# Patient Record
Sex: MALE | Race: BLACK OR AFRICAN AMERICAN | Employment: FULL TIME | URBAN - METROPOLITAN AREA
[De-identification: names, ages, dates, MRNs, and addresses within clinical notes are randomized per-mention and may not be internally consistent; named-entity substitution may affect disease eponyms.]

---

## 2017-04-24 ENCOUNTER — OFFICE VISIT (OUTPATIENT)
Dept: FAMILY MEDICINE CLINIC | Age: 37
End: 2017-04-24

## 2017-04-24 VITALS
BODY MASS INDEX: 29.77 KG/M2 | WEIGHT: 201 LBS | SYSTOLIC BLOOD PRESSURE: 128 MMHG | RESPIRATION RATE: 16 BRPM | HEART RATE: 76 BPM | HEIGHT: 69 IN | DIASTOLIC BLOOD PRESSURE: 78 MMHG | OXYGEN SATURATION: 98 %

## 2017-04-24 DIAGNOSIS — J30.2 SEASONAL AND PERENNIAL ALLERGIC RHINITIS: ICD-10-CM

## 2017-04-24 DIAGNOSIS — J30.89 SEASONAL AND PERENNIAL ALLERGIC RHINITIS: ICD-10-CM

## 2017-04-24 DIAGNOSIS — I10 ESSENTIAL HYPERTENSION: ICD-10-CM

## 2017-04-24 PROCEDURE — 99213 OFFICE O/P EST LOW 20 MIN: CPT | Performed by: FAMILY MEDICINE

## 2017-09-22 RX ORDER — ACYCLOVIR 400 MG/1
TABLET ORAL
Qty: 60 TABLET | Refills: 0 | Status: SHIPPED | OUTPATIENT
Start: 2017-09-22 | End: 2019-02-12 | Stop reason: SDUPTHER

## 2017-10-20 LAB
CHOLESTEROL, TOTAL: 183 MG/DL
CHOLESTEROL/HDL RATIO: ABNORMAL
GLUCOSE BLD-MCNC: 79 MG/DL
HDLC SERPL-MCNC: 83 MG/DL (ref 35–70)
LDL CHOLESTEROL CALCULATED: 77 MG/DL (ref 0–160)
TRIGL SERPL-MCNC: 115 MG/DL
VLDLC SERPL CALC-MCNC: ABNORMAL MG/DL

## 2017-10-23 ENCOUNTER — OFFICE VISIT (OUTPATIENT)
Dept: FAMILY MEDICINE CLINIC | Age: 37
End: 2017-10-23

## 2017-10-23 VITALS
SYSTOLIC BLOOD PRESSURE: 130 MMHG | OXYGEN SATURATION: 98 % | BODY MASS INDEX: 30.16 KG/M2 | DIASTOLIC BLOOD PRESSURE: 78 MMHG | WEIGHT: 203.6 LBS | HEART RATE: 86 BPM | HEIGHT: 69 IN | RESPIRATION RATE: 18 BRPM

## 2017-10-23 DIAGNOSIS — I10 ESSENTIAL HYPERTENSION: Primary | ICD-10-CM

## 2017-10-23 DIAGNOSIS — J30.2 SEASONAL AND PERENNIAL ALLERGIC RHINITIS: ICD-10-CM

## 2017-10-23 DIAGNOSIS — J30.2 OTHER SEASONAL ALLERGIC RHINITIS: ICD-10-CM

## 2017-10-23 DIAGNOSIS — J30.89 SEASONAL AND PERENNIAL ALLERGIC RHINITIS: ICD-10-CM

## 2017-10-23 DIAGNOSIS — Z23 NEEDS FLU SHOT: ICD-10-CM

## 2017-10-23 LAB
ANION GAP SERPL CALCULATED.3IONS-SCNC: 14 MMOL/L (ref 3–16)
BUN BLDV-MCNC: 14 MG/DL (ref 7–20)
CALCIUM SERPL-MCNC: 9.2 MG/DL (ref 8.3–10.6)
CHLORIDE BLD-SCNC: 105 MMOL/L (ref 99–110)
CO2: 28 MMOL/L (ref 21–32)
CREAT SERPL-MCNC: 1.1 MG/DL (ref 0.9–1.3)
GFR AFRICAN AMERICAN: >60
GFR NON-AFRICAN AMERICAN: >60
GLUCOSE BLD-MCNC: 95 MG/DL (ref 70–99)
POTASSIUM SERPL-SCNC: 4.2 MMOL/L (ref 3.5–5.1)
SODIUM BLD-SCNC: 147 MMOL/L (ref 136–145)

## 2017-10-23 PROCEDURE — 90686 IIV4 VACC NO PRSV 0.5 ML IM: CPT | Performed by: FAMILY MEDICINE

## 2017-10-23 PROCEDURE — 36415 COLL VENOUS BLD VENIPUNCTURE: CPT | Performed by: FAMILY MEDICINE

## 2017-10-23 PROCEDURE — 90471 IMMUNIZATION ADMIN: CPT | Performed by: FAMILY MEDICINE

## 2017-10-23 PROCEDURE — 99213 OFFICE O/P EST LOW 20 MIN: CPT | Performed by: FAMILY MEDICINE

## 2017-10-23 RX ORDER — FLUTICASONE PROPIONATE 50 MCG
SPRAY, SUSPENSION (ML) NASAL
Qty: 1 BOTTLE | Refills: 5 | Status: SHIPPED | OUTPATIENT
Start: 2017-10-23 | End: 2018-11-20 | Stop reason: SDUPTHER

## 2017-10-23 RX ORDER — AMLODIPINE BESYLATE 5 MG/1
5 TABLET ORAL DAILY
Qty: 90 TABLET | Refills: 3 | Status: SHIPPED | OUTPATIENT
Start: 2017-10-23 | End: 2018-01-02 | Stop reason: SDUPTHER

## 2017-10-23 NOTE — PROGRESS NOTES
Vaccine Information Sheet, \"Influenza - Inactivated\"  given to Marian Thompson, or parent/legal guardian of  Marian Thompson and verbalized understanding. Patient responses:    Have you ever had a reaction to a flu vaccine? No  Are you able to eat eggs without adverse effects? Yes  Do you have any current illness? No  Have you ever had Guillian Everton Syndrome? No    Flu vaccine given per order. Please see immunization tab.

## 2018-02-19 ENCOUNTER — HOSPITAL ENCOUNTER (OUTPATIENT)
Dept: GENERAL RADIOLOGY | Age: 38
Discharge: HOME | End: 2018-02-19
Attending: FAMILY MEDICINE | Admitting: FAMILY MEDICINE

## 2018-02-19 ENCOUNTER — OFFICE VISIT (OUTPATIENT)
Dept: FAMILY MEDICINE CLINIC | Age: 38
End: 2018-02-19

## 2018-02-19 VITALS
HEIGHT: 69 IN | HEART RATE: 93 BPM | DIASTOLIC BLOOD PRESSURE: 86 MMHG | SYSTOLIC BLOOD PRESSURE: 134 MMHG | BODY MASS INDEX: 30.72 KG/M2 | WEIGHT: 207.4 LBS | OXYGEN SATURATION: 99 %

## 2018-02-19 DIAGNOSIS — R14.0 ABDOMINAL BLOATING: ICD-10-CM

## 2018-02-19 DIAGNOSIS — R10.12 ABDOMINAL WALL PAIN IN LEFT UPPER QUADRANT: ICD-10-CM

## 2018-02-19 DIAGNOSIS — R14.0 ABDOMINAL BLOATING: Primary | ICD-10-CM

## 2018-02-19 PROCEDURE — 99213 OFFICE O/P EST LOW 20 MIN: CPT | Performed by: FAMILY MEDICINE

## 2018-02-19 ASSESSMENT — PATIENT HEALTH QUESTIONNAIRE - PHQ9
2. FEELING DOWN, DEPRESSED OR HOPELESS: 0
1. LITTLE INTEREST OR PLEASURE IN DOING THINGS: 0
SUM OF ALL RESPONSES TO PHQ9 QUESTIONS 1 & 2: 0
SUM OF ALL RESPONSES TO PHQ QUESTIONS 1-9: 0

## 2018-02-19 NOTE — PROGRESS NOTES
bloody stools or weight loss. 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 PHQ Scores 2/19/2018 10/24/2016 10/15/2015   PHQ2 Score 0 0 0   PHQ9 Score 0 0 0     Interpretation of Total Score Depression Severity: 1-4 = Minimal depression, 5-9 = Mild depression, 10-14 = Moderate depression, 15-19 = Moderately severe depression, 20-27 = Severe depression      ROS: Pertinent items are noted in HPI. All other ROS negative     reports that he quit smoking about 8 years ago. He has never used smokeless tobacco.   reports that he drinks alcohol. Physical Exam   Nursing note reviewed  /86 (Site: Left Arm, Position: Sitting, Cuff Size: Large Adult)   Pulse 93   Ht 5' 9\" (1.753 m)   Wt 207 lb 6.4 oz (94.1 kg)   SpO2 99%   BMI 30.63 kg/m²   BP Readings from Last 3 Encounters:   02/19/18 134/86   10/23/17 130/78   04/24/17 128/78     Wt Readings from Last 3 Encounters:   02/19/18 207 lb 6.4 oz (94.1 kg)   10/23/17 203 lb 9.6 oz (92.4 kg)   04/24/17 201 lb (91.2 kg)     No results found for this visit on 02/19/18. Physical Exam   Constitutional: He is oriented to person, place, and time. He appears well-developed and well-nourished. HENT:   Mouth/Throat: Oropharynx is clear and moist.   Eyes: Conjunctivae and EOM are normal. Pupils are equal, round, and reactive to light. Neck: Neck supple. Cardiovascular: Normal rate, regular rhythm and normal heart sounds. Pulmonary/Chest: Effort normal and breath sounds normal.   Abdominal: Soft. Bowel sounds are normal. He exhibits no distension, no ascites and no mass. There is no tenderness. There is no rigidity, no guarding and no CVA tenderness. Hernia confirmed negative in the ventral area. Neurological: He is alert and oriented to person, place, and time. Skin: Skin is warm and dry. Psychiatric: He has a normal mood and affect.          Assessment and Plan: See above

## 2018-02-20 ENCOUNTER — TELEPHONE (OUTPATIENT)
Dept: FAMILY MEDICINE CLINIC | Age: 38
End: 2018-02-20

## 2018-02-22 NOTE — TELEPHONE ENCOUNTER
Spoke with patient about xray results he is to increase stool softener to 1 bid and increase water to 60-70 ounces per day. He will notify status in 1 week. s

## 2018-05-21 DIAGNOSIS — J30.2 OTHER SEASONAL ALLERGIC RHINITIS: ICD-10-CM

## 2018-05-22 RX ORDER — MONTELUKAST SODIUM 10 MG/1
TABLET ORAL
Qty: 90 TABLET | Refills: 0 | Status: SHIPPED | OUTPATIENT
Start: 2018-05-22 | End: 2018-06-05 | Stop reason: SDUPTHER

## 2018-06-05 ENCOUNTER — OFFICE VISIT (OUTPATIENT)
Dept: FAMILY MEDICINE CLINIC | Age: 38
End: 2018-06-05

## 2018-06-05 VITALS
RESPIRATION RATE: 16 BRPM | HEART RATE: 77 BPM | OXYGEN SATURATION: 98 % | BODY MASS INDEX: 29.33 KG/M2 | HEIGHT: 69 IN | SYSTOLIC BLOOD PRESSURE: 130 MMHG | WEIGHT: 198 LBS | DIASTOLIC BLOOD PRESSURE: 78 MMHG

## 2018-06-05 DIAGNOSIS — I10 ESSENTIAL HYPERTENSION: Primary | ICD-10-CM

## 2018-06-05 DIAGNOSIS — L20.89 OTHER ATOPIC DERMATITIS: ICD-10-CM

## 2018-06-05 DIAGNOSIS — J30.2 OTHER SEASONAL ALLERGIC RHINITIS: ICD-10-CM

## 2018-06-05 PROCEDURE — 99214 OFFICE O/P EST MOD 30 MIN: CPT | Performed by: FAMILY MEDICINE

## 2018-06-05 RX ORDER — AMLODIPINE BESYLATE 5 MG/1
TABLET ORAL
Qty: 90 TABLET | Refills: 3 | Status: SHIPPED | OUTPATIENT
Start: 2018-06-05 | End: 2020-05-01 | Stop reason: SDUPTHER

## 2018-06-05 RX ORDER — TRIAMCINOLONE ACETONIDE 1 MG/G
CREAM TOPICAL EVERY 12 HOURS
Qty: 30 G | Refills: 3 | Status: SHIPPED | OUTPATIENT
Start: 2018-06-05 | End: 2020-06-22

## 2018-06-05 RX ORDER — MONTELUKAST SODIUM 10 MG/1
TABLET ORAL
Qty: 90 TABLET | Refills: 3 | Status: SHIPPED | OUTPATIENT
Start: 2018-06-05 | End: 2019-04-25 | Stop reason: SDUPTHER

## 2018-09-13 LAB
AVERAGE GLUCOSE: NORMAL
CHOLESTEROL, TOTAL: 144 MG/DL
CHOLESTEROL/HDL RATIO: NORMAL
GLUCOSE BLD-MCNC: 96 MG/DL
HBA1C MFR BLD: 4.9 %
HDLC SERPL-MCNC: 64 MG/DL (ref 35–70)
LDL CHOLESTEROL CALCULATED: 60 MG/DL (ref 0–160)
TRIGL SERPL-MCNC: 103 MG/DL
VLDLC SERPL CALC-MCNC: NORMAL MG/DL

## 2018-10-12 DIAGNOSIS — I10 ESSENTIAL HYPERTENSION: ICD-10-CM

## 2018-10-15 RX ORDER — AMLODIPINE BESYLATE 5 MG/1
TABLET ORAL
Qty: 90 TABLET | Refills: 2 | Status: SHIPPED | OUTPATIENT
Start: 2018-10-15 | End: 2019-04-02 | Stop reason: SDUPTHER

## 2018-11-09 ENCOUNTER — PATIENT MESSAGE (OUTPATIENT)
Dept: FAMILY MEDICINE CLINIC | Age: 38
End: 2018-11-09

## 2018-11-20 DIAGNOSIS — J30.2 OTHER SEASONAL ALLERGIC RHINITIS: ICD-10-CM

## 2018-11-20 RX ORDER — FLUTICASONE PROPIONATE 50 MCG
SPRAY, SUSPENSION (ML) NASAL
Qty: 16 G | Refills: 5 | Status: SHIPPED | OUTPATIENT
Start: 2018-11-20 | End: 2019-07-17 | Stop reason: SDUPTHER

## 2018-12-18 ENCOUNTER — OFFICE VISIT (OUTPATIENT)
Dept: FAMILY MEDICINE CLINIC | Age: 38
End: 2018-12-18
Payer: COMMERCIAL

## 2018-12-18 VITALS
SYSTOLIC BLOOD PRESSURE: 124 MMHG | OXYGEN SATURATION: 98 % | WEIGHT: 183.4 LBS | BODY MASS INDEX: 27.8 KG/M2 | HEART RATE: 79 BPM | DIASTOLIC BLOOD PRESSURE: 78 MMHG | HEIGHT: 68 IN

## 2018-12-18 DIAGNOSIS — R07.9 CHEST PAIN, UNSPECIFIED TYPE: Primary | ICD-10-CM

## 2018-12-18 PROCEDURE — 99213 OFFICE O/P EST LOW 20 MIN: CPT | Performed by: NURSE PRACTITIONER

## 2018-12-18 PROCEDURE — 93000 ELECTROCARDIOGRAM COMPLETE: CPT | Performed by: NURSE PRACTITIONER

## 2018-12-18 ASSESSMENT — ENCOUNTER SYMPTOMS
COUGH: 0
NAUSEA: 0
SHORTNESS OF BREATH: 0
HEMOPTYSIS: 0

## 2018-12-19 ASSESSMENT — ENCOUNTER SYMPTOMS
CONSTIPATION: 0
WHEEZING: 0
CHOKING: 0
DIARRHEA: 0
CHEST TIGHTNESS: 0

## 2019-02-14 RX ORDER — ACYCLOVIR 400 MG/1
TABLET ORAL
Qty: 60 TABLET | Refills: 0 | Status: SHIPPED | OUTPATIENT
Start: 2019-02-14 | End: 2019-09-26 | Stop reason: SDUPTHER

## 2019-04-02 DIAGNOSIS — I10 ESSENTIAL HYPERTENSION: ICD-10-CM

## 2019-04-03 RX ORDER — AMLODIPINE BESYLATE 5 MG/1
TABLET ORAL
Qty: 90 TABLET | Refills: 0 | Status: SHIPPED | OUTPATIENT
Start: 2019-04-03 | End: 2019-06-11 | Stop reason: SDUPTHER

## 2019-04-24 DIAGNOSIS — J30.2 OTHER SEASONAL ALLERGIC RHINITIS: ICD-10-CM

## 2019-04-25 RX ORDER — MONTELUKAST SODIUM 10 MG/1
10 TABLET ORAL DAILY
Qty: 90 TABLET | Refills: 3 | Status: SHIPPED | OUTPATIENT
Start: 2019-04-25 | End: 2020-05-01 | Stop reason: SDUPTHER

## 2019-06-11 ENCOUNTER — OFFICE VISIT (OUTPATIENT)
Dept: FAMILY MEDICINE CLINIC | Age: 39
End: 2019-06-11
Payer: COMMERCIAL

## 2019-06-11 VITALS
RESPIRATION RATE: 14 BRPM | WEIGHT: 167.6 LBS | SYSTOLIC BLOOD PRESSURE: 128 MMHG | HEART RATE: 80 BPM | DIASTOLIC BLOOD PRESSURE: 7 MMHG | HEIGHT: 68 IN | OXYGEN SATURATION: 99 % | BODY MASS INDEX: 25.4 KG/M2

## 2019-06-11 DIAGNOSIS — M79.644 THUMB PAIN, RIGHT: Primary | ICD-10-CM

## 2019-06-11 DIAGNOSIS — I10 ESSENTIAL HYPERTENSION: ICD-10-CM

## 2019-06-11 PROCEDURE — G8419 CALC BMI OUT NRM PARAM NOF/U: HCPCS | Performed by: FAMILY MEDICINE

## 2019-06-11 PROCEDURE — 99213 OFFICE O/P EST LOW 20 MIN: CPT | Performed by: FAMILY MEDICINE

## 2019-06-11 PROCEDURE — 1036F TOBACCO NON-USER: CPT | Performed by: FAMILY MEDICINE

## 2019-06-11 PROCEDURE — G8427 DOCREV CUR MEDS BY ELIG CLIN: HCPCS | Performed by: FAMILY MEDICINE

## 2019-06-11 ASSESSMENT — PATIENT HEALTH QUESTIONNAIRE - PHQ9
SUM OF ALL RESPONSES TO PHQ9 QUESTIONS 1 & 2: 0
SUM OF ALL RESPONSES TO PHQ QUESTIONS 1-9: 0
2. FEELING DOWN, DEPRESSED OR HOPELESS: 0
SUM OF ALL RESPONSES TO PHQ QUESTIONS 1-9: 0
1. LITTLE INTEREST OR PLEASURE IN DOING THINGS: 0

## 2019-06-11 NOTE — PATIENT INSTRUCTIONS
Patient Education        Thumb Sprain: Rehab Exercises  Your Care Instructions  Here are some examples of typical rehabilitation exercises for your condition. Start each exercise slowly. Ease off the exercise if you start to have pain. Your doctor or your physical or occupational therapist will tell you when you can start these exercises. He or she will also tell you which ones will work best for you. How to do the exercises  Thumb IP flexion    1. Place your forearm and hand on a table with your affected thumb pointing up. 2. With your other hand, hold your thumb steady just below the joint nearest your thumbnail. 3. Bend the tip of your thumb downward, then straighten it. 4. Repeat 8 to 12 times. Thumb MP flexion    1. Place your forearm and hand on a table with your affected thumb pointing up. 2. With your other hand, hold the base of your thumb and palm steady. 3. Bend your thumb downward where it meets your palm, then straighten it. 4. Repeat 8 to 12 times. Thumb opposition    1. With your affected hand, point your fingers and thumb straight up. Your wrist should be relaxed, following the line of your fingers and thumb. 2. Touch your affected thumb to each finger, one finger at a time. This will look like an \"okay\" sign, but try to keep your other fingers straight and pointing upward as much as you can. 3. Repeat 8 to 12 times. Follow-up care is a key part of your treatment and safety. Be sure to make and go to all appointments, and call your doctor if you are having problems. It's also a good idea to know your test results and keep a list of the medicines you take. Where can you learn more? Go to [email or web address] and sign in to your [facility] account. Enter D278 in the [employer] box to learn more about \"Thumb Sprain: Rehab Exercises. \"     If you do not have an account, please click on the \"Sign Up Now\" link.   Current as of: September 20, 2018  Content Version: 12.0  © 2599-2490 Healthwise, Incorporated. Care instructions adapted under license by [address] If you have questions about a medical condition or this instruction, always ask your healthcare professional. [address] any warranty or liability for your use of this information.

## 2019-06-20 ENCOUNTER — PATIENT MESSAGE (OUTPATIENT)
Dept: FAMILY MEDICINE CLINIC | Age: 39
End: 2019-06-20

## 2019-06-24 DIAGNOSIS — I10 ESSENTIAL HYPERTENSION: ICD-10-CM

## 2019-06-24 RX ORDER — AMLODIPINE BESYLATE 5 MG/1
TABLET ORAL
Qty: 90 TABLET | Refills: 3 | Status: SHIPPED | OUTPATIENT
Start: 2019-06-24 | End: 2020-12-15 | Stop reason: SDUPTHER

## 2019-07-17 ENCOUNTER — PATIENT MESSAGE (OUTPATIENT)
Dept: FAMILY MEDICINE CLINIC | Age: 39
End: 2019-07-17

## 2019-07-17 DIAGNOSIS — J30.2 OTHER SEASONAL ALLERGIC RHINITIS: ICD-10-CM

## 2019-07-17 RX ORDER — FLUTICASONE PROPIONATE 50 MCG
SPRAY, SUSPENSION (ML) NASAL
Qty: 16 G | Refills: 5 | Status: SHIPPED | OUTPATIENT
Start: 2019-07-17 | End: 2020-08-10

## 2019-09-27 RX ORDER — ACYCLOVIR 400 MG/1
TABLET ORAL
Qty: 60 TABLET | Refills: 0 | Status: SHIPPED | OUTPATIENT
Start: 2019-09-27 | End: 2021-04-06

## 2020-05-01 RX ORDER — MONTELUKAST SODIUM 10 MG/1
10 TABLET ORAL DAILY
Qty: 90 TABLET | Refills: 3 | Status: SHIPPED | OUTPATIENT
Start: 2020-05-01 | End: 2021-02-26 | Stop reason: SDUPTHER

## 2020-05-01 RX ORDER — AMLODIPINE BESYLATE 5 MG/1
TABLET ORAL
Qty: 90 TABLET | Refills: 3 | Status: SHIPPED | OUTPATIENT
Start: 2020-05-01 | End: 2020-06-22

## 2020-05-19 ENCOUNTER — TELEPHONE (OUTPATIENT)
Dept: FAMILY MEDICINE CLINIC | Age: 40
End: 2020-05-19

## 2020-05-19 NOTE — TELEPHONE ENCOUNTER
Sp to pt on the phone and he will bring fmla forms in and also a sheet of paper stating how often he is needing off and what for. pcp agreed to fill out.  Co-pay for visit refunded and appt was cancelled

## 2020-06-22 ENCOUNTER — OFFICE VISIT (OUTPATIENT)
Dept: FAMILY MEDICINE CLINIC | Age: 40
End: 2020-06-22
Payer: COMMERCIAL

## 2020-06-22 VITALS
SYSTOLIC BLOOD PRESSURE: 124 MMHG | DIASTOLIC BLOOD PRESSURE: 72 MMHG | BODY MASS INDEX: 25.25 KG/M2 | WEIGHT: 166.6 LBS | OXYGEN SATURATION: 99 % | HEART RATE: 77 BPM | HEIGHT: 68 IN

## 2020-06-22 LAB
ANION GAP SERPL CALCULATED.3IONS-SCNC: 13 MMOL/L (ref 3–16)
BUN BLDV-MCNC: 19 MG/DL (ref 7–20)
CALCIUM SERPL-MCNC: 8.9 MG/DL (ref 8.3–10.6)
CHLORIDE BLD-SCNC: 103 MMOL/L (ref 99–110)
CHOLESTEROL, TOTAL: 199 MG/DL (ref 0–199)
CO2: 27 MMOL/L (ref 21–32)
CREAT SERPL-MCNC: 0.8 MG/DL (ref 0.9–1.3)
GFR AFRICAN AMERICAN: >60
GFR NON-AFRICAN AMERICAN: >60
GLUCOSE BLD-MCNC: 86 MG/DL (ref 70–99)
HDLC SERPL-MCNC: 95 MG/DL (ref 40–60)
LDL CHOLESTEROL CALCULATED: 92 MG/DL
POTASSIUM SERPL-SCNC: 3.9 MMOL/L (ref 3.5–5.1)
SODIUM BLD-SCNC: 143 MMOL/L (ref 136–145)
TRIGL SERPL-MCNC: 60 MG/DL (ref 0–150)
VLDLC SERPL CALC-MCNC: 12 MG/DL

## 2020-06-22 PROCEDURE — 1036F TOBACCO NON-USER: CPT | Performed by: NURSE PRACTITIONER

## 2020-06-22 PROCEDURE — 99213 OFFICE O/P EST LOW 20 MIN: CPT | Performed by: NURSE PRACTITIONER

## 2020-06-22 PROCEDURE — G8427 DOCREV CUR MEDS BY ELIG CLIN: HCPCS | Performed by: NURSE PRACTITIONER

## 2020-06-22 PROCEDURE — G8419 CALC BMI OUT NRM PARAM NOF/U: HCPCS | Performed by: NURSE PRACTITIONER

## 2020-06-22 PROCEDURE — 36415 COLL VENOUS BLD VENIPUNCTURE: CPT | Performed by: NURSE PRACTITIONER

## 2020-06-22 SDOH — HEALTH STABILITY: MENTAL HEALTH: HOW OFTEN DO YOU HAVE A DRINK CONTAINING ALCOHOL?: 2-4 TIMES A MONTH

## 2020-06-22 SDOH — HEALTH STABILITY: MENTAL HEALTH: HOW MANY STANDARD DRINKS CONTAINING ALCOHOL DO YOU HAVE ON A TYPICAL DAY?: 3 OR 4

## 2020-06-22 ASSESSMENT — ENCOUNTER SYMPTOMS
ABDOMINAL PAIN: 0
CONSTIPATION: 0
SHORTNESS OF BREATH: 0
TROUBLE SWALLOWING: 0
BLOOD IN STOOL: 0
COUGH: 0
DIARRHEA: 0
BACK PAIN: 0
NAUSEA: 0
VOMITING: 0

## 2020-06-22 ASSESSMENT — PATIENT HEALTH QUESTIONNAIRE - PHQ9
2. FEELING DOWN, DEPRESSED OR HOPELESS: 0
SUM OF ALL RESPONSES TO PHQ QUESTIONS 1-9: 0
SUM OF ALL RESPONSES TO PHQ QUESTIONS 1-9: 0
SUM OF ALL RESPONSES TO PHQ9 QUESTIONS 1 & 2: 0
1. LITTLE INTEREST OR PLEASURE IN DOING THINGS: 0

## 2020-06-22 NOTE — PROGRESS NOTES
Rosa M Harrison MD   NONFORMULARY Protein powder Yes Historical Provider, MD        Social History     Tobacco Use    Smoking status: Former Smoker     Last attempt to quit: 7/7/2009     Years since quitting: 10.9    Smokeless tobacco: Never Used    Tobacco comment: occasional cigars in the past   Substance Use Topics    Alcohol use: Yes     Frequency: 2-4 times a month     Drinks per session: 3 or 4     Comment: Socially, beer liquor. Vitals:    06/22/20 0849   BP: 124/72   Site: Left Upper Arm   Position: Sitting   Pulse: 77   SpO2: 99%   Weight: 166 lb 9.6 oz (75.6 kg)   Height: 5' 8\" (1.727 m)     Estimated body mass index is 25.33 kg/m² as calculated from the following:    Height as of this encounter: 5' 8\" (1.727 m). Weight as of this encounter: 166 lb 9.6 oz (75.6 kg). Physical Exam  Vitals signs reviewed. Constitutional:       General: He is not in acute distress. Appearance: Normal appearance. He is normal weight. He is not ill-appearing, toxic-appearing or diaphoretic. HENT:      Head: Normocephalic and atraumatic. Nose: Nose normal.   Eyes:      Extraocular Movements: Extraocular movements intact. Pupils: Pupils are equal, round, and reactive to light. Neck:      Musculoskeletal: Normal range of motion and neck supple. Cardiovascular:      Rate and Rhythm: Normal rate and regular rhythm. Heart sounds: Normal heart sounds. Pulmonary:      Effort: Pulmonary effort is normal. No respiratory distress. Breath sounds: Normal breath sounds. Abdominal:      General: Bowel sounds are normal. There is no distension. Palpations: Abdomen is soft. There is no mass. Tenderness: There is no abdominal tenderness. Hernia: No hernia is present. Musculoskeletal: Normal range of motion. Skin:     General: Skin is warm and dry. Neurological:      General: No focal deficit present. Mental Status: He is alert and oriented to person, place, and time.

## 2020-08-10 RX ORDER — FLUTICASONE PROPIONATE 50 MCG
SPRAY, SUSPENSION (ML) NASAL
Qty: 1 BOTTLE | Refills: 5 | Status: SHIPPED | OUTPATIENT
Start: 2020-08-10 | Stop reason: SDUPTHER

## 2020-12-17 RX ORDER — AMLODIPINE BESYLATE 5 MG/1
TABLET ORAL
Qty: 90 TABLET | Refills: 3 | Status: SHIPPED | OUTPATIENT
Start: 2020-12-17 | End: 2021-12-28

## 2021-02-26 DIAGNOSIS — J30.2 OTHER SEASONAL ALLERGIC RHINITIS: ICD-10-CM

## 2021-02-26 RX ORDER — MONTELUKAST SODIUM 10 MG/1
10 TABLET ORAL DAILY
Qty: 90 TABLET | Refills: 3 | Status: SHIPPED | OUTPATIENT
Start: 2021-02-26 | End: 2022-07-12 | Stop reason: SDUPTHER

## 2021-04-06 DIAGNOSIS — A60.00 GENITAL HERPES: ICD-10-CM

## 2021-04-06 RX ORDER — ACYCLOVIR 400 MG/1
400 TABLET ORAL 3 TIMES DAILY
Qty: 60 TABLET | Refills: 5 | Status: SHIPPED | OUTPATIENT
Start: 2021-04-06

## 2021-04-06 RX ORDER — ACYCLOVIR 400 MG/1
TABLET ORAL
Qty: 60 TABLET | Refills: 0 | Status: CANCELLED | OUTPATIENT
Start: 2021-04-06

## 2021-05-13 DIAGNOSIS — J30.2 OTHER SEASONAL ALLERGIC RHINITIS: ICD-10-CM

## 2021-05-13 RX ORDER — FLUTICASONE PROPIONATE 50 MCG
SPRAY, SUSPENSION (ML) NASAL
Qty: 1 BOTTLE | Refills: 11 | Status: SHIPPED | OUTPATIENT
Start: 2021-05-13 | End: 2021-08-27 | Stop reason: SDUPTHER

## 2021-06-22 ENCOUNTER — OFFICE VISIT (OUTPATIENT)
Dept: FAMILY MEDICINE CLINIC | Age: 41
End: 2021-06-22
Payer: COMMERCIAL

## 2021-06-22 VITALS
DIASTOLIC BLOOD PRESSURE: 82 MMHG | OXYGEN SATURATION: 97 % | SYSTOLIC BLOOD PRESSURE: 130 MMHG | BODY MASS INDEX: 26.46 KG/M2 | HEART RATE: 78 BPM | WEIGHT: 174 LBS

## 2021-06-22 DIAGNOSIS — I10 ESSENTIAL HYPERTENSION: ICD-10-CM

## 2021-06-22 DIAGNOSIS — Z00.00 WELL ADULT EXAM: Primary | ICD-10-CM

## 2021-06-22 LAB
ANION GAP SERPL CALCULATED.3IONS-SCNC: 10 MMOL/L (ref 3–16)
BUN BLDV-MCNC: 17 MG/DL (ref 7–20)
CALCIUM SERPL-MCNC: 9.7 MG/DL (ref 8.3–10.6)
CHLORIDE BLD-SCNC: 103 MMOL/L (ref 99–110)
CHOLESTEROL, FASTING: 183 MG/DL (ref 0–199)
CO2: 29 MMOL/L (ref 21–32)
CREAT SERPL-MCNC: 0.9 MG/DL (ref 0.9–1.3)
GFR AFRICAN AMERICAN: >60
GFR NON-AFRICAN AMERICAN: >60
GLUCOSE BLD-MCNC: 90 MG/DL (ref 70–99)
HDLC SERPL-MCNC: 86 MG/DL (ref 40–60)
LDL CHOLESTEROL CALCULATED: 87 MG/DL
POTASSIUM SERPL-SCNC: 4.3 MMOL/L (ref 3.5–5.1)
SODIUM BLD-SCNC: 142 MMOL/L (ref 136–145)
TRIGLYCERIDE, FASTING: 48 MG/DL (ref 0–150)
VLDLC SERPL CALC-MCNC: 10 MG/DL

## 2021-06-22 PROCEDURE — 99395 PREV VISIT EST AGE 18-39: CPT | Performed by: FAMILY MEDICINE

## 2021-06-22 RX ORDER — TRIAMCINOLONE ACETONIDE 1 MG/G
CREAM TOPICAL EVERY 12 HOURS
Qty: 30 G | Refills: 3 | Status: SHIPPED | OUTPATIENT
Start: 2021-06-22

## 2021-06-22 ASSESSMENT — PATIENT HEALTH QUESTIONNAIRE - PHQ9
SUM OF ALL RESPONSES TO PHQ9 QUESTIONS 1 & 2: 0
2. FEELING DOWN, DEPRESSED OR HOPELESS: 0
SUM OF ALL RESPONSES TO PHQ QUESTIONS 1-9: 0
1. LITTLE INTEREST OR PLEASURE IN DOING THINGS: 0
SUM OF ALL RESPONSES TO PHQ QUESTIONS 1-9: 0
SUM OF ALL RESPONSES TO PHQ QUESTIONS 1-9: 0

## 2021-06-22 NOTE — PROGRESS NOTES
Plan:   1. Well adult exam  2. Essential hypertension  -     Basic Metabolic Panel; Future  -     Lipid, Fasting; Future      Work/ school status:   Restrictions: full duty  today    Discussed use, benefit, and side effects of prescribed medications. Patient instructed to notify if develop side effects from medications. Barriers to medication compliance addressed. All patient questions answered. Pt voiced understanding. Return in about 1 year (around 2022) for Med refills. -----------------------------------------------------------------------------------------------            Chief Complaint   Patient presents with    Annual Exam    Hypertension    Blood Work     Overall feeling well. No complaints. Allergies doing well on singulair and flonase. Compliant with meds. Patient denies any exertional chest pain, dyspnea, palpitations, syncope, orthopnea, edema or paroxysmal nocturnal dyspnea. The patient denies cough, chest pain, dyspnea, wheezing or hemoptysis. 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 Past Medical History:   Diagnosis Date    Blood in stool 3/12/2012    Bright red rectal bleeding 10/3/2013    Dermatitis 2012    External hemorrhoids 10/3/2013    Genital herpes 2009    Hemorrhoid 3/12/2012    Hyperbilirubinemia 2011    Total bilirubin. Indirect 1.6 Direct 0.6   Saw Dr. Javier Gibson years ago with negative liver and GB work up     Rash 2013    Right-sided sensorineural hearing loss     mild- tested at work    Viral URI 2013     History reviewed. No pertinent surgical history. Social History     Tobacco Use    Smoking status: Former Smoker     Quit date: 2009     Years since quittin.9    Smokeless tobacco: Never Used    Tobacco comment: occasional cigars in the past   Substance Use Topics    Alcohol use: Yes     Comment: Socially, beer liquor. ROS: Pertinent items are noted in HPI.  All

## 2021-08-27 DIAGNOSIS — J30.2 OTHER SEASONAL ALLERGIC RHINITIS: ICD-10-CM

## 2021-08-27 RX ORDER — FLUTICASONE PROPIONATE 50 MCG
SPRAY, SUSPENSION (ML) NASAL
Qty: 1 BOTTLE | Refills: 11 | Status: SHIPPED | OUTPATIENT
Start: 2021-08-27 | End: 2022-10-05

## 2021-12-28 DIAGNOSIS — I10 ESSENTIAL HYPERTENSION: ICD-10-CM

## 2021-12-28 RX ORDER — AMLODIPINE BESYLATE 5 MG/1
TABLET ORAL
Qty: 90 TABLET | Refills: 3 | Status: SHIPPED | OUTPATIENT
Start: 2021-12-28 | End: 2022-01-03 | Stop reason: SDUPTHER

## 2022-01-03 DIAGNOSIS — I10 ESSENTIAL HYPERTENSION: ICD-10-CM

## 2022-01-05 RX ORDER — AMLODIPINE BESYLATE 5 MG/1
5 TABLET ORAL DAILY
Qty: 90 TABLET | Refills: 3 | Status: SHIPPED | OUTPATIENT
Start: 2022-01-05 | End: 2022-03-29 | Stop reason: SDUPTHER

## 2022-03-29 DIAGNOSIS — I10 ESSENTIAL HYPERTENSION: ICD-10-CM

## 2022-03-31 RX ORDER — AMLODIPINE BESYLATE 5 MG/1
5 TABLET ORAL DAILY
Qty: 90 TABLET | Refills: 3 | Status: SHIPPED | OUTPATIENT
Start: 2022-03-31

## 2022-07-12 ENCOUNTER — OFFICE VISIT (OUTPATIENT)
Dept: FAMILY MEDICINE CLINIC | Age: 42
End: 2022-07-12
Payer: COMMERCIAL

## 2022-07-12 VITALS
SYSTOLIC BLOOD PRESSURE: 130 MMHG | OXYGEN SATURATION: 95 % | WEIGHT: 153.4 LBS | DIASTOLIC BLOOD PRESSURE: 84 MMHG | HEART RATE: 92 BPM | HEIGHT: 68 IN | BODY MASS INDEX: 23.25 KG/M2

## 2022-07-12 DIAGNOSIS — J30.89 SEASONAL AND PERENNIAL ALLERGIC RHINITIS: ICD-10-CM

## 2022-07-12 DIAGNOSIS — Z00.00 ENCOUNTER FOR WELL ADULT EXAM WITHOUT ABNORMAL FINDINGS: Primary | ICD-10-CM

## 2022-07-12 DIAGNOSIS — I10 ESSENTIAL HYPERTENSION: ICD-10-CM

## 2022-07-12 DIAGNOSIS — J30.2 OTHER SEASONAL ALLERGIC RHINITIS: ICD-10-CM

## 2022-07-12 DIAGNOSIS — J30.2 SEASONAL AND PERENNIAL ALLERGIC RHINITIS: ICD-10-CM

## 2022-07-12 DIAGNOSIS — G47.9 SLEEP DISTURBANCES: ICD-10-CM

## 2022-07-12 LAB
ANION GAP SERPL CALCULATED.3IONS-SCNC: 10 MMOL/L (ref 3–16)
BUN BLDV-MCNC: 18 MG/DL (ref 7–20)
CALCIUM SERPL-MCNC: 9.4 MG/DL (ref 8.3–10.6)
CHLORIDE BLD-SCNC: 102 MMOL/L (ref 99–110)
CO2: 29 MMOL/L (ref 21–32)
CREAT SERPL-MCNC: 1 MG/DL (ref 0.9–1.3)
GFR AFRICAN AMERICAN: >60
GFR NON-AFRICAN AMERICAN: >60
GLUCOSE BLD-MCNC: 87 MG/DL (ref 70–99)
POTASSIUM SERPL-SCNC: 3.9 MMOL/L (ref 3.5–5.1)
SODIUM BLD-SCNC: 141 MMOL/L (ref 136–145)

## 2022-07-12 PROCEDURE — 99396 PREV VISIT EST AGE 40-64: CPT | Performed by: FAMILY MEDICINE

## 2022-07-12 RX ORDER — MONTELUKAST SODIUM 10 MG/1
10 TABLET ORAL DAILY
Qty: 90 TABLET | Refills: 3 | Status: SHIPPED | OUTPATIENT
Start: 2022-07-12 | End: 2023-07-13

## 2022-07-12 SDOH — ECONOMIC STABILITY: FOOD INSECURITY: WITHIN THE PAST 12 MONTHS, YOU WORRIED THAT YOUR FOOD WOULD RUN OUT BEFORE YOU GOT MONEY TO BUY MORE.: PATIENT DECLINED

## 2022-07-12 SDOH — ECONOMIC STABILITY: FOOD INSECURITY: WITHIN THE PAST 12 MONTHS, THE FOOD YOU BOUGHT JUST DIDN'T LAST AND YOU DIDN'T HAVE MONEY TO GET MORE.: PATIENT DECLINED

## 2022-07-12 ASSESSMENT — SOCIAL DETERMINANTS OF HEALTH (SDOH): HOW HARD IS IT FOR YOU TO PAY FOR THE VERY BASICS LIKE FOOD, HOUSING, MEDICAL CARE, AND HEATING?: PATIENT DECLINED

## 2022-07-12 ASSESSMENT — PATIENT HEALTH QUESTIONNAIRE - PHQ9
SUM OF ALL RESPONSES TO PHQ QUESTIONS 1-9: 0
SUM OF ALL RESPONSES TO PHQ QUESTIONS 1-9: 0
SUM OF ALL RESPONSES TO PHQ9 QUESTIONS 1 & 2: 0
SUM OF ALL RESPONSES TO PHQ QUESTIONS 1-9: 0
2. FEELING DOWN, DEPRESSED OR HOPELESS: 0
1. LITTLE INTEREST OR PLEASURE IN DOING THINGS: 0
SUM OF ALL RESPONSES TO PHQ QUESTIONS 1-9: 0

## 2022-07-12 NOTE — LETTER
[address] [facility]  [facility]  Phone: [phone]  Fax: [phone]        July 12, 2022     Patient: Sukhwinder Deluna   YOB: 1980   Date of Visit: 7/12/2022       To Whom It May Concern: It is my medical opinion that Kasia Poe may return to work without restrictions tomorrow 7/12/22. He was seen in our office today for his medical appointment. If you have any questions or concerns, please don't hesitate to call. Sincerely,          Dina Martinez M.D., M.P.H  [address] [place] [address].  [address] [place] [address] [address]  [facility], [address] [facility]  Phone: [phone]  Fax: [phone]

## 2022-07-12 NOTE — PROGRESS NOTES
Well Adult Note  Name: Yanira Garcia Date: 2022   MRN: [id number] Sex: Male   Age: 38 y.o. Ethnicity: Non- / Non    : 1980 Race: Andrea Howell / Tessa Rosario is here for well adult exam.  History:  Chief Complaint   Patient presents with    Annual Exam     Reports doing okay. Working lots of overtime 6 days per weei 60 hours per week. Good support from wife who works from home. 2 kids age 9 and 6 doing well. Sleeping has been difficult and feel restless at times. Denies any suicidial or homicidal ideation or hallucinations. Appetite okay. Still enjoys working out at [employer] and does lots of walking at work. Patient denies any exertional chest pain, dyspnea, palpitations, syncope, orthopnea, edema or paroxysmal nocturnal dyspnea. The patient denies cough, chest pain, dyspnea, wheezing or hemoptysis. tolerating meds without side effects. Review of Systems per HPI     Allergies   Allergen Reactions    Hctz [Hydrochlorothiazide] Rash         Prior to Visit Medications    Medication Sig Taking?  Authorizing Provider   montelukast (SINGULAIR) 10 MG tablet Take 1 tablet by mouth daily Yes Sulma Segura MD   amLODIPine (NORVASC) 5 MG tablet Take 1 tablet by mouth daily Yes Sulma Segura MD   fluticasone (FLONASE) 50 MCG/ACT nasal spray USE 2 SPRAYS BY NASAL ROUTE DAILY AS NEEDED Yes Sulma Segura MD   triamcinolone (KENALOG) 0.1 % cream Apply topically every 12 hours Apply to area of rash on body for next 2 weeks Yes Sulma Segura MD   acyclovir (ZOVIRAX) 400 MG tablet Take 1 tablet by mouth 3 times daily For 5 days for outbreaks Yes Sulma Segura MD   NONFORMULARY Protein powder Yes Historical Provider, MD   fluticasone (FLONASE) 50 MCG/ACT nasal spray USE 2 SPRAYS BY NASAL ROUTE DAILY AS NEEDED  Sulma Segura MD         Past Medical History:   Diagnosis Date  Blood in stool 3/12/2012    Bright red rectal bleeding 10/3/2013    Dermatitis 2012    External hemorrhoids 10/3/2013    Genital herpes 2009    Hemorrhoid 3/12/2012    Hyperbilirubinemia 2011    Total bilirubin. Indirect 1.6 Direct 0.6   Saw Dr. Patricia Fontaine years ago with negative liver and GB work up     Rash 2013    Right-sided sensorineural hearing loss     mild- tested at work    Viral URI 2013       History reviewed. No pertinent surgical history. Family History   Problem Relation Age of Onset    High Blood Pressure Father 36       Social History     Tobacco Use    Smoking status: Former Smoker     Quit date: 2009     Years since quittin.0    Smokeless tobacco: Never Used    Tobacco comment: occasional cigars in the past   Substance Use Topics    Alcohol use: Yes     Comment: Socially, beer liquor.  Drug use: No       Objective   /84   Pulse 92   Ht 5' 8\" (1.727 m)   Wt 153 lb 6.4 oz (69.6 kg)   SpO2 95%   BMI 23.32 kg/m²   Wt Readings from Last 3 Encounters:   22 153 lb 6.4 oz (69.6 kg)   21 174 lb (78.9 kg)   20 166 lb 9.6 oz (75.6 kg)     There were no vitals filed for this visit. Physical Exam  Constitutional:       Appearance: He is well-developed. Eyes:      Conjunctiva/sclera: Conjunctivae normal.      Pupils: Pupils are equal, round, and reactive to light. Cardiovascular:      Rate and Rhythm: Normal rate and regular rhythm. Heart sounds: Normal heart sounds. Pulmonary:      Effort: Pulmonary effort is normal.      Breath sounds: Normal breath sounds. Abdominal:      General: Bowel sounds are normal.      Palpations: Abdomen is soft. Musculoskeletal:      Cervical back: Neck supple. Skin:     General: Skin is warm and dry. Neurological:      Mental Status: He is alert and oriented to person, place, and time. Assessment   Plan   1. Encounter for well adult exam without abnormal findings  2.

## 2022-07-12 NOTE — ASSESSMENT & PLAN NOTE
Patient will try conservative things at home and notify if worsens. Consider melatonin and sleep hygiene changes.

## 2022-07-12 NOTE — LETTER
[address] [facility]  [address] [facility],[address] [facility]  Phone: [phone]  Fax: [phone]    Alice Canales MD        July 12, 2022     Patient: Brian George   YOB: 1980   Date of Visit: 7/12/2022       To Whom It May Concern: It is my medical opinion that Colleen Mcintosh may return to work on 7/13/2022 Wednesday without restrictions. He was seen for his medical appointment today in our office. If you have any questions or concerns, please don't hesitate to call. Sincerely,        Eleazar Vaughan M.D., M.P.H  [address] [place] [address]   [address]  [facility] [facility] [address]  [facility], [address] [facility]  Phone: [phone]  Fax: [phone]

## 2022-07-14 NOTE — RESULT ENCOUNTER NOTE
Please notify that the results of the labs were stable    Continue their current medication and plan as discussed at their last visit.

## 2022-09-29 ENCOUNTER — OFFICE VISIT (OUTPATIENT)
Dept: FAMILY MEDICINE CLINIC | Age: 42
End: 2022-09-29
Payer: COMMERCIAL

## 2022-09-29 VITALS
HEART RATE: 80 BPM | HEIGHT: 68 IN | DIASTOLIC BLOOD PRESSURE: 84 MMHG | WEIGHT: 156.2 LBS | BODY MASS INDEX: 23.67 KG/M2 | SYSTOLIC BLOOD PRESSURE: 134 MMHG | OXYGEN SATURATION: 92 %

## 2022-09-29 DIAGNOSIS — K64.9 HEMORRHOIDS, UNSPECIFIED HEMORRHOID TYPE: Primary | ICD-10-CM

## 2022-09-29 PROCEDURE — 99213 OFFICE O/P EST LOW 20 MIN: CPT | Performed by: NURSE PRACTITIONER

## 2022-09-29 NOTE — PROGRESS NOTES
2022     Perry Alcantara (:  1980) is a 38 y.o. male, here for evaluation of the following medical concerns:    Concern for hemorrhoids   Present for years. States he has no pain or bleeding. He does lift heavy weights. No straining or pain with BM's      Review of Systems    Prior to Visit Medications    Medication Sig Taking? Authorizing Provider   montelukast (SINGULAIR) 10 MG tablet Take 1 tablet by mouth daily Yes Aniya Peralta MD   amLODIPine (NORVASC) 5 MG tablet Take 1 tablet by mouth daily Yes Aniya Peralta MD   fluticasone (FLONASE) 50 MCG/ACT nasal spray USE 2 SPRAYS BY NASAL ROUTE DAILY AS NEEDED Yes Aniya Peralta MD   triamcinolone (KENALOG) 0.1 % cream Apply topically every 12 hours Apply to area of rash on body for next 2 weeks Yes Aniya Peralta MD   acyclovir (ZOVIRAX) 400 MG tablet Take 1 tablet by mouth 3 times daily For 5 days for outbreaks Yes Aniya Peralta MD   NONFORMULARY Protein powder Yes Historical Provider, MD   fluticasone (FLONASE) 50 MCG/ACT nasal spray USE 2 SPRAYS BY NASAL ROUTE DAILY AS NEEDED  Aniya Peralta MD        Social History     Tobacco Use    Smoking status: Former     Types: Cigarettes     Quit date: 2009     Years since quittin.2    Smokeless tobacco: Never    Tobacco comments:     occasional cigars in the past   Substance Use Topics    Alcohol use: Yes     Comment: Socially, beer liquor. Vitals:    22 1306   BP: 134/84   Pulse: 80   SpO2: 92%   Weight: 156 lb 3.2 oz (70.9 kg)   Height: 5' 8\" (1.727 m)     Estimated body mass index is 23.75 kg/m² as calculated from the following:    Height as of this encounter: 5' 8\" (1.727 m). Weight as of this encounter: 156 lb 3.2 oz (70.9 kg). Physical Exam  Genitourinary:         Comments: External hemorrhoid. No blood present. Non tender. ASSESSMENT/PLAN:  1.  Hemorrhoids, unspecified hemorrhoid type  No straining. Increase water intake. Limit straining with lifting heavy weights. Refer to gen surg.         -provider offered to have second staff member in room for exam. Patient declined. All care gaps addressed     All questions answered    Discussed use, benefit, and side effects of prescribed medications. Barriers to compliance discussed. All patient questions answered. Pt voiced understanding. Present to the ER for any emergent or acute symptoms not managed at home or in office. Please note that this chart was generated using dragon dictation software. Although every effort was made to ensure the accuracy of this automated transcription, some errors in transcription may have occurred. No follow-ups on file. An electronic signature was used to authenticate this note.     --TJ Torres - NP on 9/29/2022 at 1:28 PM

## 2022-09-30 ENCOUNTER — TELEPHONE (OUTPATIENT)
Dept: SURGERY | Age: 42
End: 2022-09-30

## 2022-10-03 DIAGNOSIS — J30.2 OTHER SEASONAL ALLERGIC RHINITIS: ICD-10-CM

## 2022-10-05 RX ORDER — FLUTICASONE PROPIONATE 50 MCG
SPRAY, SUSPENSION (ML) NASAL
Qty: 48 G | Refills: 3 | Status: SHIPPED | OUTPATIENT
Start: 2022-10-05 | End: 2022-10-12 | Stop reason: SDUPTHER

## 2022-10-07 DIAGNOSIS — J30.2 OTHER SEASONAL ALLERGIC RHINITIS: ICD-10-CM

## 2022-10-07 RX ORDER — FLUTICASONE PROPIONATE 50 MCG
SPRAY, SUSPENSION (ML) NASAL
Qty: 48 G | Refills: 3 | OUTPATIENT
Start: 2022-10-07

## 2022-10-10 ENCOUNTER — OFFICE VISIT (OUTPATIENT)
Dept: SURGERY | Age: 42
End: 2022-10-10
Payer: COMMERCIAL

## 2022-10-10 VITALS
HEIGHT: 68 IN | BODY MASS INDEX: 23.72 KG/M2 | WEIGHT: 156.5 LBS | DIASTOLIC BLOOD PRESSURE: 82 MMHG | SYSTOLIC BLOOD PRESSURE: 124 MMHG | OXYGEN SATURATION: 99 % | HEART RATE: 113 BPM

## 2022-10-10 DIAGNOSIS — K64.8 INTERNAL HEMORRHOID: Primary | ICD-10-CM

## 2022-10-10 PROCEDURE — 99204 OFFICE O/P NEW MOD 45 MIN: CPT | Performed by: SURGERY

## 2022-10-10 ASSESSMENT — PATIENT HEALTH QUESTIONNAIRE - PHQ9
SUM OF ALL RESPONSES TO PHQ QUESTIONS 1-9: 0
1. LITTLE INTEREST OR PLEASURE IN DOING THINGS: 0
SUM OF ALL RESPONSES TO PHQ QUESTIONS 1-9: 0
SUM OF ALL RESPONSES TO PHQ QUESTIONS 1-9: 0
2. FEELING DOWN, DEPRESSED OR HOPELESS: 0
SUM OF ALL RESPONSES TO PHQ QUESTIONS 1-9: 0
SUM OF ALL RESPONSES TO PHQ9 QUESTIONS 1 & 2: 0

## 2022-10-11 ASSESSMENT — ENCOUNTER SYMPTOMS
ANAL BLEEDING: 0
RECTAL PAIN: 0
BACK PAIN: 0
STRIDOR: 0
APNEA: 0
COLOR CHANGE: 0
PHOTOPHOBIA: 0
CONSTIPATION: 0
SORE THROAT: 0
CHOKING: 0
EYE ITCHING: 0
EYE REDNESS: 0

## 2022-10-12 RX ORDER — FLUTICASONE PROPIONATE 50 MCG
SPRAY, SUSPENSION (ML) NASAL
Qty: 48 G | Refills: 3 | Status: SHIPPED | OUTPATIENT
Start: 2022-10-12

## 2022-10-12 NOTE — PROGRESS NOTES
Chief Complaint   Patient presents with    New Patient     NP-Hemorrhoids -REF: Nawaf Songwell       SUBJECTIVE:  HPI: Patient presents for evaluation of possible hemorrhoids. Onset of symptoms was several years ago. Patient does report perianal mass. Patient describes symptoms as gradually worsening. Patient denies fever/chills. Patient denies any drainage from the rectal area. Treatment to date has been OTC creams: No. Patient denies a personalhistory of colon cancer. Patient denies family hx of colorectal CA  Patient denies a personal history of IBD. I have reviewedthe patient's(pertinent information to this visit) medical history, family history(scanned in  the Media tab under \"patient questioner\"), social history and review of systems with the patient today in the office. No past surgical history on file. Past Medical History:   Diagnosis Date    Blood in stool 3/12/2012    Bright red rectal bleeding 10/3/2013    Dermatitis 2012    External hemorrhoids 10/3/2013    Genital herpes 2009    Hemorrhoid 3/12/2012    Hyperbilirubinemia 2011    Total bilirubin. Indirect 1.6 Direct 0.6   Saw Dr. Jose Carrasco years ago with negative liver and GB work up     Rash 2013    Right-sided sensorineural hearing loss     mild- tested at work    Viral URI 2013     Family History   Problem Relation Age of Onset    High Blood Pressure Father 36     Social History     Socioeconomic History    Marital status:      Spouse name: N/A    Number of children: Not on file    Years of education: Not on file    Highest education level: Not on file   Occupational History    Occupation: Factory     Comment: [employer]   Tobacco Use    Smoking status: Former     Types: Cigarettes     Quit date: 2009     Years since quittin.2    Smokeless tobacco: Never    Tobacco comments:     occasional cigars in the past   Substance and Sexual Activity    Alcohol use: Yes     Comment: Socially, beer liquor. Drug use: No    Sexual activity: Not on file   Other Topics Concern    Not on file   Social History Narrative    Lives with girlfriend     Social Determinants of Health     Financial Resource Strain: Unknown    Difficulty of Paying Living Expenses: Patient refused   Food Insecurity: Unknown    Worried About Running Out of Food in the Last Year: Patient refused    Ran Out of Food in the Last Year: Patient refused   Transportation Needs: Not on file   Physical Activity: Not on file   Stress: Not on file   Social Connections: Not on file   Intimate Partner Violence: Not on file   Housing Stability: Not on file       Current Outpatient Medications   Medication Sig Dispense Refill    fluticasone (FLONASE) 50 MCG/ACT nasal spray USE 2 SPRAYS BY NASAL ROUTE DAILY AS NEEDED 48 g 3    montelukast (SINGULAIR) 10 MG tablet Take 1 tablet by mouth daily 90 tablet 3    amLODIPine (NORVASC) 5 MG tablet Take 1 tablet by mouth daily 90 tablet 3    triamcinolone (KENALOG) 0.1 % cream Apply topically every 12 hours Apply to area of rash on body for next 2 weeks 30 g 3    acyclovir (ZOVIRAX) 400 MG tablet Take 1 tablet by mouth 3 times daily For 5 days for outbreaks 60 tablet 5    NONFORMULARY Protein powder (Patient not taking: Reported on 10/10/2022)       No current facility-administered medications for this visit. Allergies   Allergen Reactions    Hctz [Hydrochlorothiazide] Rash       Review of Systems:         Review of Systems   Constitutional:  Negative for chills and fever. HENT:  Negative for ear pain, mouth sores, sore throat and tinnitus. Eyes:  Negative for photophobia, redness and itching. Respiratory:  Negative for apnea, choking and stridor. Cardiovascular:  Negative for chest pain and palpitations. Gastrointestinal:  Negative for anal bleeding, constipation and rectal pain. Endocrine: Negative for polydipsia. Genitourinary:  Negative for enuresis, flank pain and hematuria.    Musculoskeletal: Negative for back pain, joint swelling and myalgias. Skin:  Negative for color change and pallor. Allergic/Immunologic: Negative for environmental allergies. Neurological:  Negative for syncope and speech difficulty. Psychiatric/Behavioral:  Negative for confusion and hallucinations. OBJECTIVE:  Physical Exam:    /82 (Site: Left Upper Arm, Position: Sitting, Cuff Size: Medium Adult)   Pulse (!) 113   Ht 5' 8\" (1.727 m)   Wt 156 lb 8 oz (71 kg)   SpO2 99%   BMI 23.80 kg/m²      Physical Exam  Constitutional:       Appearance: He is well-developed. HENT:      Head: Normocephalic. Eyes:      Pupils: Pupils are equal, round, and reactive to light. Cardiovascular:      Rate and Rhythm: Normal rate. Pulmonary:      Effort: Pulmonary effort is normal.   Abdominal:      General: There is no distension. Palpations: Abdomen is soft. There is no mass. Tenderness: There is no abdominal tenderness. There is no guarding or rebound. Genitourinary:     Rectum: External hemorrhoid and internal hemorrhoid present. Musculoskeletal:         General: Normal range of motion. Cervical back: Normal range of motion and neck supple. Skin:     General: Skin is warm. Neurological:      Mental Status: He is alert and oriented to person, place, and time. ASSESSMENT:  1. Internal hemorrhoid          PLAN:  Treatment: We will proceed with exam under anesthesia and pneumonectomy. .     Patient counseled on risks, benefits, and alternatives of treatment plan at length today. Patient states an understanding and willingness to proceed with plan. No orders of the defined types were placed in this encounter. No orders of the defined types were placed in this encounter. Follow Up:  No follow-ups on file.       Emi Herrera MD

## 2022-10-13 ENCOUNTER — TELEPHONE (OUTPATIENT)
Dept: SURGERY | Age: 42
End: 2022-10-13

## 2022-10-13 NOTE — TELEPHONE ENCOUNTER
SPOKE TO  [place] ([facility]UA, HEMORRHOIDECTOMY) SCHEDULED @ [address] [place] [address].  NOTIFIED OF DATES, TIMES AND LOCATION    PHONE ASSESSMENT   SURGERY - 11/1/22 @ 845  P/O - 11/14/22 @ 1100    NPO AFTER MIDNIGHT  HOLD BLOOD THINNERS - NA

## 2022-10-24 ENCOUNTER — TELEPHONE (OUTPATIENT)
Dept: SURGERY | Age: 42
End: 2022-10-24

## 2022-10-27 ENCOUNTER — TELEPHONE (OUTPATIENT)
Dept: SURGERY | Age: 42
End: 2022-10-27

## 2022-12-02 DIAGNOSIS — J30.2 OTHER SEASONAL ALLERGIC RHINITIS: ICD-10-CM

## 2022-12-07 RX ORDER — FLUTICASONE PROPIONATE 50 MCG
SPRAY, SUSPENSION (ML) NASAL
Qty: 48 G | Refills: 3 | Status: SHIPPED | OUTPATIENT
Start: 2022-12-07

## 2022-12-26 DIAGNOSIS — I10 ESSENTIAL HYPERTENSION: ICD-10-CM

## 2022-12-27 RX ORDER — AMLODIPINE BESYLATE 5 MG/1
5 TABLET ORAL DAILY
Qty: 90 TABLET | Refills: 0 | Status: SHIPPED | OUTPATIENT
Start: 2022-12-27

## 2023-01-22 DIAGNOSIS — J30.2 OTHER SEASONAL ALLERGIC RHINITIS: ICD-10-CM

## 2023-01-23 RX ORDER — FLUTICASONE PROPIONATE 50 MCG
SPRAY, SUSPENSION (ML) NASAL
Qty: 48 G | Refills: 3 | Status: SHIPPED | OUTPATIENT
Start: 2023-01-23

## 2023-06-13 NOTE — PROGRESS NOTES
Plan:   Essential hypertension  Stable on current regimen. Labs as ordered. Pt doing well with staying active at the gym    Seasonal and perennial allergic rhinitis  Doing well on current prn singulair and flonase. Refill given            Diagnosis Assessment and Associated Orders:    1. Essential hypertension  amLODIPine (NORVASC) 5 MG tablet    Basic Metabolic Panel   2. Other seasonal allergic rhinitis  fluticasone (FLONASE) 50 MCG/ACT nasal spray   3. Needs flu shot  INFLUENZA, QUADV, 3 YRS AND OLDER, IM, PF, PREFILL SYR OR SDV, 0.5ML (FLUZONE QUADV, PF)         Work/ school status:   Restrictions: full duty  today    Discussed use, benefit, and side effects of prescribed medications. Patient instructed to notify if develop side effects from medications. Barriers to medication compliance addressed. All patient questions answered. Pt voiced understanding. Return in about 6 months (around 4/23/2018) for HTN, allergic rhinitis .  -----------------------------------------------------------------------------------------------            Chief Complaint   Patient presents with    6 Month Follow-Up    Hypertension     Overall doing well. Still working 2nd shift and going to gym after work. Patient denies any exertional chest pain, dyspnea, palpitations, syncope, orthopnea, edema or paroxysmal nocturnal dyspnea. some nasal congestion during this allergy season but improved with singulair and flonase. Adherent to amlodipine without side effects. Home blood pressure 120/130s. No results found for: LABA1C  Lab Results   Component Value Date    CREATININE 1.0 10/24/2016     Lab Results   Component Value Date    ALT 22 10/24/2016    AST 18 10/24/2016     Lab Results   Component Value Date    CHOL 199 10/24/2016    TRIG 103 10/24/2016    HDL 73 (H) 10/24/2016    LDLCALC 105 (H) 10/24/2016              ROS: Pertinent items are noted in HPI.  All other ROS negative     reports that he quit smoking about 8 Report given to ICU RN. No further questions at this time.       Niels Rendon, MOISES  06/12/23 4591 years ago. He has never used smokeless tobacco.   reports that he drinks alcohol. Physical Exam   Nursing note reviewed  /78 (Site: Left Arm, Position: Sitting, Cuff Size: Large Adult)   Pulse 86   Resp 18   Ht 5' 9\" (1.753 m)   Wt 203 lb 9.6 oz (92.4 kg)   SpO2 98%   BMI 30.07 kg/m²   BP Readings from Last 3 Encounters:   10/23/17 130/78   04/24/17 128/78   10/24/16 128/84     Wt Readings from Last 3 Encounters:   10/23/17 203 lb 9.6 oz (92.4 kg)   04/24/17 201 lb (91.2 kg)   10/24/16 201 lb 4.8 oz (91.3 kg)       General appearance: cooperative   Neck: supple, symmetrical, trachea midline  Lungs: clear to auscultation bilaterally  Heart: regular rate and rhythm, S1, S2 normal,  Abdomen:  bowel sounds normal and soft, non-tender  MSK no LE edema  Skin: Skin color, texture, turgor normal. No rashes or lesions  Neurologic: Grossly normal   Psych: Alert and oriented, appropriate affect.     Assessment and Plan: See above

## 2023-07-05 DIAGNOSIS — I10 ESSENTIAL HYPERTENSION: ICD-10-CM

## 2023-07-06 RX ORDER — AMLODIPINE BESYLATE 5 MG/1
TABLET ORAL
Qty: 90 TABLET | Refills: 3 | Status: SHIPPED | OUTPATIENT
Start: 2023-07-06

## 2023-07-27 ENCOUNTER — HOSPITAL ENCOUNTER (OUTPATIENT)
Age: 43
Discharge: HOME | End: 2023-07-27
Payer: COMMERCIAL

## 2023-07-27 ENCOUNTER — OFFICE VISIT (OUTPATIENT)
Dept: FAMILY MEDICINE CLINIC | Age: 43
End: 2023-07-27
Payer: COMMERCIAL

## 2023-07-27 VITALS
OXYGEN SATURATION: 97 % | DIASTOLIC BLOOD PRESSURE: 80 MMHG | WEIGHT: 161.2 LBS | HEIGHT: 69 IN | SYSTOLIC BLOOD PRESSURE: 126 MMHG | BODY MASS INDEX: 23.88 KG/M2 | HEART RATE: 70 BPM

## 2023-07-27 DIAGNOSIS — I10 ESSENTIAL HYPERTENSION: ICD-10-CM

## 2023-07-27 DIAGNOSIS — Z13.220 SCREENING FOR LIPID DISORDERS: ICD-10-CM

## 2023-07-27 DIAGNOSIS — K64.9 HEMORRHOIDS, UNSPECIFIED HEMORRHOID TYPE: ICD-10-CM

## 2023-07-27 DIAGNOSIS — A60.00 RECURRENT GENITAL HSV (HERPES SIMPLEX VIRUS) INFECTION: ICD-10-CM

## 2023-07-27 DIAGNOSIS — J30.2 SEASONAL AND PERENNIAL ALLERGIC RHINITIS: ICD-10-CM

## 2023-07-27 DIAGNOSIS — L20.89 OTHER ATOPIC DERMATITIS: ICD-10-CM

## 2023-07-27 DIAGNOSIS — Z00.00 ANNUAL PHYSICAL EXAM: Primary | ICD-10-CM

## 2023-07-27 DIAGNOSIS — J30.89 SEASONAL AND PERENNIAL ALLERGIC RHINITIS: ICD-10-CM

## 2023-07-27 LAB
ALBUMIN SERPL-MCNC: 4.4 GM/DL (ref 3.4–5)
ALP BLD-CCNC: 58 IU/L (ref 40–128)
ALT SERPL-CCNC: 18 U/L (ref 10–40)
ANION GAP SERPL CALCULATED.3IONS-SCNC: 7 MMOL/L (ref 4–16)
AST SERPL-CCNC: 28 IU/L (ref 15–37)
BILIRUB SERPL-MCNC: 1.9 MG/DL (ref 0–1)
BUN SERPL-MCNC: 17 MG/DL (ref 6–23)
CALCIUM SERPL-MCNC: 9.4 MG/DL (ref 8.3–10.6)
CHLORIDE BLD-SCNC: 103 MMOL/L (ref 99–110)
CHOLEST SERPL-MCNC: 200 MG/DL
CO2: 29 MMOL/L (ref 21–32)
CREAT SERPL-MCNC: 1 MG/DL (ref 0.9–1.3)
GFR SERPL CREATININE-BSD FRML MDRD: >60 ML/MIN/1.73M2
GLUCOSE SERPL-MCNC: 90 MG/DL (ref 70–99)
HDLC SERPL-MCNC: 100 MG/DL
LDLC SERPL CALC-MCNC: 87 MG/DL
POTASSIUM SERPL-SCNC: 4.5 MMOL/L (ref 3.5–5.1)
SODIUM BLD-SCNC: 139 MMOL/L (ref 135–145)
TOTAL PROTEIN: 7 GM/DL (ref 6.4–8.2)
TRIGL SERPL-MCNC: 67 MG/DL

## 2023-07-27 PROCEDURE — 80053 COMPREHEN METABOLIC PANEL: CPT

## 2023-07-27 PROCEDURE — 36415 COLL VENOUS BLD VENIPUNCTURE: CPT

## 2023-07-27 PROCEDURE — 80061 LIPID PANEL: CPT

## 2023-07-27 PROCEDURE — 3074F SYST BP LT 130 MM HG: CPT | Performed by: NURSE PRACTITIONER

## 2023-07-27 PROCEDURE — 99396 PREV VISIT EST AGE 40-64: CPT | Performed by: NURSE PRACTITIONER

## 2023-07-27 PROCEDURE — 3079F DIAST BP 80-89 MM HG: CPT | Performed by: NURSE PRACTITIONER

## 2023-07-27 RX ORDER — TRIAMCINOLONE ACETONIDE 1 MG/G
CREAM TOPICAL EVERY 12 HOURS
Qty: 30 G | Refills: 3 | Status: SHIPPED | OUTPATIENT
Start: 2023-07-27

## 2023-07-27 SDOH — ECONOMIC STABILITY: FOOD INSECURITY: WITHIN THE PAST 12 MONTHS, THE FOOD YOU BOUGHT JUST DIDN'T LAST AND YOU DIDN'T HAVE MONEY TO GET MORE.: NEVER TRUE

## 2023-07-27 SDOH — ECONOMIC STABILITY: FOOD INSECURITY: WITHIN THE PAST 12 MONTHS, YOU WORRIED THAT YOUR FOOD WOULD RUN OUT BEFORE YOU GOT MONEY TO BUY MORE.: NEVER TRUE

## 2023-07-27 SDOH — ECONOMIC STABILITY: HOUSING INSECURITY
IN THE LAST 12 MONTHS, WAS THERE A TIME WHEN YOU DID NOT HAVE A STEADY PLACE TO SLEEP OR SLEPT IN A SHELTER (INCLUDING NOW)?: NO

## 2023-07-27 SDOH — ECONOMIC STABILITY: INCOME INSECURITY: HOW HARD IS IT FOR YOU TO PAY FOR THE VERY BASICS LIKE FOOD, HOUSING, MEDICAL CARE, AND HEATING?: NOT HARD AT ALL

## 2023-07-27 ASSESSMENT — PATIENT HEALTH QUESTIONNAIRE - PHQ9
SUM OF ALL RESPONSES TO PHQ9 QUESTIONS 1 & 2: 0
2. FEELING DOWN, DEPRESSED OR HOPELESS: NOT AT ALL
1. LITTLE INTEREST OR PLEASURE IN DOING THINGS: NOT AT ALL
SUM OF ALL RESPONSES TO PHQ QUESTIONS 1-9: 0
1. LITTLE INTEREST OR PLEASURE IN DOING THINGS: 0
SUM OF ALL RESPONSES TO PHQ9 QUESTIONS 1 & 2: 0
2. FEELING DOWN, DEPRESSED OR HOPELESS: 0
SUM OF ALL RESPONSES TO PHQ QUESTIONS 1-9: 0

## 2023-07-27 NOTE — PROGRESS NOTES
2023     Becky Patton (:  1980) is a 41 y.o. male, here for evaluation of the following medical concerns:    Annual exam     Hemorrhoids - saw gen surg. They offered to remove them, he wants to wait. Reports they are not currently bothering him. No bleeding or pain. HTN - controlled with norvasc 5mg daily. Denies headache dizziness chest pain swelling, change in vision    Singulair and flonase for seasonal allergies     Acyclovir PRN for HSV2 outbreaks. None recent     Kenalog topical for intermittent atopic dermatitis. Works well for him. No concerns or questions today other than colon cancer screening   No FH colon cancer. Former Smoker - quit        Review of Systems    Prior to Visit Medications    Medication Sig Taking? Authorizing Provider   triamcinolone (KENALOG) 0.1 % cream Apply topically every 12 hours Apply to area of rash on body for next 2 weeks Yes Nita Dior, APRN - NP   amLODIPine (NORVASC) 5 MG tablet TAKE 1 TABLET BY MOUTH EVERY DAY Yes Lady Gongora MD   fluticasone (FLONASE) 50 MCG/ACT nasal spray USE 2 SPRAYS BY NASAL ROUTE DAILY AS NEEDED Yes Lady Gongora MD   montelukast (SINGULAIR) 10 MG tablet Take 1 tablet by mouth daily Yes Lady Gongora MD   acyclovir (ZOVIRAX) 400 MG tablet Take 1 tablet by mouth 3 times daily For 5 days for outbreaks Yes Lady Gongora MD   NONFORMULARY Protein powder Yes Historical Provider, MD   fluticasone (FLONASE) 50 MCG/ACT nasal spray USE 2 SPRAYS BY NASAL ROUTE DAILY AS NEEDED  Lady Gongora MD        Social History     Tobacco Use    Smoking status: Former     Types: Cigarettes     Quit date: 2009     Years since quittin.0    Smokeless tobacco: Never    Tobacco comments:     occasional cigars in the past   Substance Use Topics    Alcohol use: Yes     Comment: Socially, beer liquor.          Vitals:    23 1037   BP: 126/80   Site:

## 2023-08-03 DIAGNOSIS — E80.6 HYPERBILIRUBINEMIA: Primary | ICD-10-CM

## 2023-08-06 DIAGNOSIS — J30.2 OTHER SEASONAL ALLERGIC RHINITIS: ICD-10-CM

## 2023-08-08 RX ORDER — MONTELUKAST SODIUM 10 MG/1
TABLET ORAL
Qty: 90 TABLET | Refills: 3 | Status: SHIPPED | OUTPATIENT
Start: 2023-08-08

## 2023-08-14 ENCOUNTER — PATIENT MESSAGE (OUTPATIENT)
Dept: FAMILY MEDICINE CLINIC | Age: 43
End: 2023-08-14

## 2023-08-14 NOTE — TELEPHONE ENCOUNTER
From: Libertad Davies  To: Dr. Henna Montelongo: 8/14/2023 12:55 PM EDT  Subject: [place] [facility]    I was checking on my fmla papers I dropped of if dr Valarie Llanes filled them out.  I had a deadline of August 15

## 2023-08-30 DIAGNOSIS — A60.00 GENITAL HERPES: ICD-10-CM

## 2023-08-30 RX ORDER — ACYCLOVIR 400 MG/1
400 TABLET ORAL 3 TIMES DAILY
Qty: 60 TABLET | Refills: 5 | Status: SHIPPED | OUTPATIENT
Start: 2023-08-30

## 2024-04-25 DIAGNOSIS — J30.2 OTHER SEASONAL ALLERGIC RHINITIS: ICD-10-CM

## 2024-04-25 RX ORDER — FLUTICASONE PROPIONATE 50 MCG
SPRAY, SUSPENSION (ML) NASAL
Qty: 48 G | Refills: 3 | Status: SHIPPED | OUTPATIENT
Start: 2024-04-25

## 2024-04-25 NOTE — TELEPHONE ENCOUNTER
Last appointment: 7/12/2022   Next Appointment: 8/6/2024     Allergies:  Allergies   Allergen Reactions    Hctz [Hydrochlorothiazide] Rash         Recent Vitals:  Wt Readings from Last 3 Encounters:   07/27/23 73.1 kg (161 lb 3.2 oz)   10/10/22 71 kg (156 lb 8 oz)   09/29/22 70.9 kg (156 lb 3.2 oz)     Ht Readings from Last 3 Encounters:   07/27/23 1.753 m (5' 9\")   10/10/22 1.727 m (5' 8\")   09/29/22 1.727 m (5' 8\")     BP Readings from Last 3 Encounters:   07/27/23 126/80   10/10/22 124/82   09/29/22 134/84     BMI Readings from Last 3 Encounters:   07/27/23 23.81 kg/m²   10/10/22 23.80 kg/m²   09/29/22 23.75 kg/m²       Recent Labs__________________________________________________________________________    Renal:   Creatinine   Date Value Ref Range Status   07/27/2023 1.0 0.9 - 1.3 MG/DL Final     BUN   Date Value Ref Range Status   07/27/2023 17 6 - 23 MG/DL Final     Sodium   Date Value Ref Range Status   07/27/2023 139 135 - 145 MMOL/L Final     Potassium   Date Value Ref Range Status   07/27/2023 4.5 3.5 - 5.1 MMOL/L Final     Chloride   Date Value Ref Range Status   07/27/2023 103 99 - 110 mMol/L Final     CO2   Date Value Ref Range Status   07/27/2023 29 21 - 32 MMOL/L Final       BMP:    Sodium   Date Value Ref Range Status   07/27/2023 139 135 - 145 MMOL/L Final     Potassium   Date Value Ref Range Status   07/27/2023 4.5 3.5 - 5.1 MMOL/L Final     Chloride   Date Value Ref Range Status   07/27/2023 103 99 - 110 mMol/L Final     CO2   Date Value Ref Range Status   07/27/2023 29 21 - 32 MMOL/L Final     BUN   Date Value Ref Range Status   07/27/2023 17 6 - 23 MG/DL Final     Creatinine   Date Value Ref Range Status   07/27/2023 1.0 0.9 - 1.3 MG/DL Final     Glucose   Date Value Ref Range Status   07/27/2023 90 70 - 99 MG/DL Final     Calcium   Date Value Ref Range Status   07/27/2023 9.4 8.3 - 10.6 MG/DL Final     Est, Glom Filt Rate   Date Value Ref Range Status   07/27/2023 >60 >60 mL/min/1.73m2 Final

## 2024-05-06 DIAGNOSIS — J30.2 OTHER SEASONAL ALLERGIC RHINITIS: ICD-10-CM

## 2024-05-06 RX ORDER — FLUTICASONE PROPIONATE 50 MCG
SPRAY, SUSPENSION (ML) NASAL
Qty: 48 G | Refills: 3 | OUTPATIENT
Start: 2024-05-06

## 2024-05-07 NOTE — TELEPHONE ENCOUNTER
Pt called today regarding flonase. He stated the [facility] that was [email or web address] [place] and they do not accept his insurance. The Script should have been send to [facility] on [address]. Pt stated he hasn't used [facility] in awhile. Please advise

## 2024-05-13 RX ORDER — FLUTICASONE PROPIONATE 50 MCG
SPRAY, SUSPENSION (ML) NASAL
Qty: 48 G | Refills: 3 | Status: SHIPPED | OUTPATIENT
Start: 2024-05-13

## 2024-05-13 NOTE — TELEPHONE ENCOUNTER
Patient called in regards to his medication refill. He would like the medication to be sent to [facility] on [address].  Stated that it was sent to the incorrect pharmacy and he did not  the one from April. Please advise.

## 2024-07-19 DIAGNOSIS — I10 ESSENTIAL HYPERTENSION: ICD-10-CM

## 2024-07-19 RX ORDER — AMLODIPINE BESYLATE 5 MG/1
TABLET ORAL
Qty: 90 TABLET | Refills: 3 | Status: SHIPPED | OUTPATIENT
Start: 2024-07-19

## 2024-08-06 ENCOUNTER — HOSPITAL ENCOUNTER (OUTPATIENT)
Age: 44
Discharge: HOME | End: 2024-08-06
Payer: COMMERCIAL

## 2024-08-06 ENCOUNTER — OFFICE VISIT (OUTPATIENT)
Dept: FAMILY MEDICINE CLINIC | Age: 44
End: 2024-08-06
Payer: COMMERCIAL

## 2024-08-06 VITALS
WEIGHT: 164 LBS | OXYGEN SATURATION: 98 % | BODY MASS INDEX: 24.29 KG/M2 | DIASTOLIC BLOOD PRESSURE: 84 MMHG | HEART RATE: 73 BPM | SYSTOLIC BLOOD PRESSURE: 126 MMHG | HEIGHT: 69 IN

## 2024-08-06 DIAGNOSIS — I10 ESSENTIAL HYPERTENSION: ICD-10-CM

## 2024-08-06 DIAGNOSIS — Z00.00 ENCOUNTER FOR WELL ADULT EXAM WITHOUT ABNORMAL FINDINGS: Primary | ICD-10-CM

## 2024-08-06 LAB
ALBUMIN SERPL-MCNC: 4.7 GM/DL (ref 3.4–5)
ALP BLD-CCNC: 63 IU/L (ref 40–128)
ALT SERPL-CCNC: 21 U/L (ref 10–40)
ANION GAP SERPL CALCULATED.3IONS-SCNC: 11 MMOL/L (ref 7–16)
AST SERPL-CCNC: 23 IU/L (ref 15–37)
BILIRUB SERPL-MCNC: 1.9 MG/DL (ref 0–1)
BUN SERPL-MCNC: 18 MG/DL (ref 6–23)
CALCIUM SERPL-MCNC: 9.6 MG/DL (ref 8.3–10.6)
CHLORIDE BLD-SCNC: 98 MMOL/L (ref 99–110)
CHOLESTEROL, FASTING: 209 MG/DL
CO2: 29 MMOL/L (ref 21–32)
CREAT SERPL-MCNC: 0.9 MG/DL (ref 0.9–1.3)
GFR, ESTIMATED: >90 ML/MIN/1.73M2
GLUCOSE P FAST SERPL-MCNC: 85 MG/DL (ref 70–99)
HDLC SERPL-MCNC: 99 MG/DL
LDLC SERPL CALC-MCNC: 89 MG/DL
POTASSIUM SERPL-SCNC: 4.2 MMOL/L (ref 3.5–5.1)
SODIUM BLD-SCNC: 138 MMOL/L (ref 135–145)
TOTAL PROTEIN: 8.1 GM/DL (ref 6.4–8.2)
TRIGLYCERIDE, FASTING: 103 MG/DL

## 2024-08-06 PROCEDURE — 3074F SYST BP LT 130 MM HG: CPT | Performed by: FAMILY MEDICINE

## 2024-08-06 PROCEDURE — 80053 COMPREHEN METABOLIC PANEL: CPT

## 2024-08-06 PROCEDURE — 3079F DIAST BP 80-89 MM HG: CPT | Performed by: FAMILY MEDICINE

## 2024-08-06 PROCEDURE — 80061 LIPID PANEL: CPT

## 2024-08-06 PROCEDURE — 36415 COLL VENOUS BLD VENIPUNCTURE: CPT

## 2024-08-06 PROCEDURE — 99396 PREV VISIT EST AGE 40-64: CPT | Performed by: FAMILY MEDICINE

## 2024-08-06 SDOH — ECONOMIC STABILITY: FOOD INSECURITY: WITHIN THE PAST 12 MONTHS, THE FOOD YOU BOUGHT JUST DIDN'T LAST AND YOU DIDN'T HAVE MONEY TO GET MORE.: NEVER TRUE

## 2024-08-06 SDOH — ECONOMIC STABILITY: FOOD INSECURITY: WITHIN THE PAST 12 MONTHS, YOU WORRIED THAT YOUR FOOD WOULD RUN OUT BEFORE YOU GOT MONEY TO BUY MORE.: NEVER TRUE

## 2024-08-06 SDOH — ECONOMIC STABILITY: INCOME INSECURITY: HOW HARD IS IT FOR YOU TO PAY FOR THE VERY BASICS LIKE FOOD, HOUSING, MEDICAL CARE, AND HEATING?: NOT HARD AT ALL

## 2024-08-06 ASSESSMENT — PATIENT HEALTH QUESTIONNAIRE - PHQ9
2. FEELING DOWN, DEPRESSED OR HOPELESS: NOT AT ALL
SUM OF ALL RESPONSES TO PHQ QUESTIONS 1-9: 0
SUM OF ALL RESPONSES TO PHQ9 QUESTIONS 1 & 2: 0
1. LITTLE INTEREST OR PLEASURE IN DOING THINGS: NOT AT ALL
SUM OF ALL RESPONSES TO PHQ QUESTIONS 1-9: 0
2. FEELING DOWN, DEPRESSED OR HOPELESS: NOT AT ALL
SUM OF ALL RESPONSES TO PHQ QUESTIONS 1-9: 0
SUM OF ALL RESPONSES TO PHQ QUESTIONS 1-9: 0
1. LITTLE INTEREST OR PLEASURE IN DOING THINGS: NOT AT ALL
SUM OF ALL RESPONSES TO PHQ9 QUESTIONS 1 & 2: 0

## 2024-08-06 NOTE — PROGRESS NOTES
(1) 08/01/2024     Recommendations for Preventive Services Due: see orders and patient instructions/AVS.

## 2024-09-13 PROBLEM — R17 SERUM TOTAL BILIRUBIN ELEVATED: Status: ACTIVE | Noted: 2024-09-13

## 2024-10-02 DIAGNOSIS — J30.2 OTHER SEASONAL ALLERGIC RHINITIS: ICD-10-CM

## 2024-10-03 RX ORDER — MONTELUKAST SODIUM 10 MG/1
TABLET ORAL
Qty: 90 TABLET | Refills: 3 | Status: SHIPPED | OUTPATIENT
Start: 2024-10-03

## 2024-12-31 DIAGNOSIS — A60.00 GENITAL HERPES: ICD-10-CM

## 2025-01-02 ENCOUNTER — PATIENT MESSAGE (OUTPATIENT)
Dept: FAMILY MEDICINE CLINIC | Age: 45
End: 2025-01-02

## 2025-01-02 RX ORDER — ACYCLOVIR 400 MG/1
400 TABLET ORAL
Qty: 25 TABLET | Refills: 1 | Status: SHIPPED | OUTPATIENT
Start: 2025-01-02 | End: 2025-01-07

## 2025-01-02 RX ORDER — ACYCLOVIR 400 MG/1
400 TABLET ORAL 3 TIMES DAILY
Qty: 60 TABLET | Refills: 5 | OUTPATIENT
Start: 2025-01-02

## 2025-05-13 DIAGNOSIS — J30.2 OTHER SEASONAL ALLERGIC RHINITIS: ICD-10-CM

## 2025-05-13 RX ORDER — MONTELUKAST SODIUM 10 MG/1
10 TABLET ORAL DAILY
Qty: 90 TABLET | Refills: 1 | Status: SHIPPED | OUTPATIENT
Start: 2025-05-13

## 2025-06-06 DIAGNOSIS — J30.2 OTHER SEASONAL ALLERGIC RHINITIS: ICD-10-CM

## 2025-06-06 RX ORDER — FLUTICASONE PROPIONATE 50 MCG
SPRAY, SUSPENSION (ML) NASAL
Qty: 16 ML | Refills: 11 | Status: SHIPPED | OUTPATIENT
Start: 2025-06-06

## 2025-07-18 DIAGNOSIS — I10 ESSENTIAL HYPERTENSION: ICD-10-CM

## 2025-07-22 RX ORDER — AMLODIPINE BESYLATE 5 MG/1
5 TABLET ORAL DAILY
Qty: 90 TABLET | Refills: 0 | Status: SHIPPED | OUTPATIENT
Start: 2025-07-22

## 2025-09-02 ENCOUNTER — HOSPITAL ENCOUNTER (OUTPATIENT)
Dept: LAB | Age: 45
Discharge: HOME | End: 2025-09-02
Payer: COMMERCIAL

## 2025-09-02 DIAGNOSIS — I10 ESSENTIAL HYPERTENSION: ICD-10-CM

## 2025-09-02 DIAGNOSIS — R17 SERUM TOTAL BILIRUBIN ELEVATED: ICD-10-CM

## 2025-09-02 LAB
ALBUMIN SERPL-MCNC: 4.3 G/DL (ref 3.4–5)
ALBUMIN/GLOB SERPL: 1.4 {RATIO} (ref 1.1–2.2)
ALP SERPL-CCNC: 66 U/L (ref 40–129)
ALT SERPL-CCNC: 16 U/L (ref 10–40)
ANION GAP SERPL CALCULATED.3IONS-SCNC: 13 MMOL/L (ref 9–17)
AST SERPL-CCNC: 25 U/L (ref 15–37)
BILIRUB DIRECT SERPL-MCNC: 0.5 MG/DL (ref 0–0.3)
BILIRUB SERPL-MCNC: 1.3 MG/DL (ref 0–1)
BILIRUB SERPL-MCNC: 1.4 MG/DL (ref 0–1)
BUN SERPL-MCNC: 13 MG/DL (ref 7–20)
CALCIUM SERPL-MCNC: 9.3 MG/DL (ref 8.3–10.6)
CHLORIDE SERPL-SCNC: 101 MMOL/L (ref 99–110)
CO2 SERPL-SCNC: 26 MMOL/L (ref 21–32)
CREAT SERPL-MCNC: 1 MG/DL (ref 0.9–1.3)
GFR, ESTIMATED: 83 ML/MIN/1.73M2
GLUCOSE P FAST SERPL-MCNC: 68 MG/DL (ref 74–99)
POTASSIUM SERPL-SCNC: 3.9 MMOL/L (ref 3.5–5.1)
PROT SERPL-MCNC: 7.5 G/DL (ref 6.4–8.2)
SODIUM SERPL-SCNC: 141 MMOL/L (ref 136–145)

## 2025-09-02 PROCEDURE — 80053 COMPREHEN METABOLIC PANEL: CPT

## 2025-09-02 PROCEDURE — 82248 BILIRUBIN DIRECT: CPT
